# Patient Record
Sex: MALE | Race: OTHER | NOT HISPANIC OR LATINO
[De-identification: names, ages, dates, MRNs, and addresses within clinical notes are randomized per-mention and may not be internally consistent; named-entity substitution may affect disease eponyms.]

---

## 2020-09-21 ENCOUNTER — TRANSCRIPTION ENCOUNTER (OUTPATIENT)
Age: 60
End: 2020-09-21

## 2021-04-19 PROBLEM — Z00.00 ENCOUNTER FOR PREVENTIVE HEALTH EXAMINATION: Status: ACTIVE | Noted: 2021-04-19

## 2021-04-26 DIAGNOSIS — M25.50 PAIN IN UNSPECIFIED JOINT: ICD-10-CM

## 2021-04-26 DIAGNOSIS — Z87.19 PERSONAL HISTORY OF OTHER DISEASES OF THE DIGESTIVE SYSTEM: ICD-10-CM

## 2021-04-26 DIAGNOSIS — Z83.79 FAMILY HISTORY OF OTHER DISEASES OF THE DIGESTIVE SYSTEM: ICD-10-CM

## 2021-04-26 DIAGNOSIS — G47.33 OBSTRUCTIVE SLEEP APNEA (ADULT) (PEDIATRIC): ICD-10-CM

## 2021-04-26 DIAGNOSIS — K57.32 DIVERTICULITIS OF LARGE INTESTINE W/OUT PERFORATION OR ABSCESS W/OUT BLEEDING: ICD-10-CM

## 2021-04-26 DIAGNOSIS — K64.9 UNSPECIFIED HEMORRHOIDS: ICD-10-CM

## 2021-04-26 DIAGNOSIS — K76.0 FATTY (CHANGE OF) LIVER, NOT ELSEWHERE CLASSIFIED: ICD-10-CM

## 2021-04-26 DIAGNOSIS — K21.9 GASTRO-ESOPHAGEAL REFLUX DISEASE W/OUT ESOPHAGITIS: ICD-10-CM

## 2021-04-26 DIAGNOSIS — Z87.891 PERSONAL HISTORY OF NICOTINE DEPENDENCE: ICD-10-CM

## 2021-04-26 DIAGNOSIS — Z80.8 FAMILY HISTORY OF MALIGNANT NEOPLASM OF OTHER ORGANS OR SYSTEMS: ICD-10-CM

## 2021-04-26 RX ORDER — FAMOTIDINE 10 MG/1
TABLET, FILM COATED ORAL
Refills: 0 | Status: ACTIVE | COMMUNITY

## 2021-04-26 RX ORDER — BISMUTH SUBSALICYLATE 262 MG
TABLET,CHEWABLE ORAL
Refills: 0 | Status: ACTIVE | COMMUNITY

## 2021-04-27 ENCOUNTER — APPOINTMENT (OUTPATIENT)
Dept: UROLOGY | Facility: CLINIC | Age: 61
End: 2021-04-27
Payer: COMMERCIAL

## 2021-04-27 VITALS
SYSTOLIC BLOOD PRESSURE: 128 MMHG | WEIGHT: 170 LBS | HEART RATE: 75 BPM | HEIGHT: 73 IN | DIASTOLIC BLOOD PRESSURE: 85 MMHG | TEMPERATURE: 97.8 F | OXYGEN SATURATION: 98 % | BODY MASS INDEX: 22.53 KG/M2

## 2021-04-27 DIAGNOSIS — N52.01 ERECTILE DYSFUNCTION DUE TO ARTERIAL INSUFFICIENCY: ICD-10-CM

## 2021-04-27 DIAGNOSIS — Z80.3 FAMILY HISTORY OF MALIGNANT NEOPLASM OF BREAST: ICD-10-CM

## 2021-04-27 DIAGNOSIS — Z82.0 FAMILY HISTORY OF EPILEPSY AND OTHER DISEASES OF THE NERVOUS SYSTEM: ICD-10-CM

## 2021-04-27 DIAGNOSIS — Z85.828 PERSONAL HISTORY OF OTHER MALIGNANT NEOPLASM OF SKIN: ICD-10-CM

## 2021-04-27 DIAGNOSIS — M51.26 OTHER INTERVERTEBRAL DISC DISPLACEMENT, LUMBAR REGION: ICD-10-CM

## 2021-04-27 DIAGNOSIS — N28.1 CYST OF KIDNEY, ACQUIRED: ICD-10-CM

## 2021-04-27 DIAGNOSIS — K40.90 UNILATERAL INGUINAL HERNIA, W/OUT OBSTRUCTION OR GANGRENE, NOT SPECIFIED AS RECURRENT: ICD-10-CM

## 2021-04-27 DIAGNOSIS — Z85.820 PERSONAL HISTORY OF MALIGNANT MELANOMA OF SKIN: ICD-10-CM

## 2021-04-27 DIAGNOSIS — R10.9 UNSPECIFIED ABDOMINAL PAIN: ICD-10-CM

## 2021-04-27 LAB
BILIRUB UR QL STRIP: NORMAL
CLARITY UR: CLEAR
COLLECTION METHOD: NORMAL
GLUCOSE UR-MCNC: NORMAL
HCG UR QL: 0.2 EU/DL
HGB UR QL STRIP.AUTO: NORMAL
KETONES UR-MCNC: NORMAL
LEUKOCYTE ESTERASE UR QL STRIP: NORMAL
NITRITE UR QL STRIP: NORMAL
PH UR STRIP: 7
PROT UR STRIP-MCNC: NORMAL
SP GR UR STRIP: 1.01

## 2021-04-27 PROCEDURE — 99072 ADDL SUPL MATRL&STAF TM PHE: CPT

## 2021-04-27 PROCEDURE — 99205 OFFICE O/P NEW HI 60 MIN: CPT

## 2021-04-27 PROCEDURE — 76857 US EXAM PELVIC LIMITED: CPT

## 2021-04-27 RX ORDER — TADALAFIL 10 MG/1
10 TABLET, FILM COATED ORAL
Qty: 10 | Refills: 6 | Status: ACTIVE | COMMUNITY
Start: 2021-04-27 | End: 1900-01-01

## 2021-04-27 NOTE — ADDENDUM
[FreeTextEntry1] : A portion of this note was written by [Rehan Rosas] on 04/26/2021 acting as a scribe for Dr. Lugo. \par \par I have personally reviewed the chart and agree that the record accurately reflects my personal performance of the history, physical exam, assessment, and plan.

## 2021-04-27 NOTE — ASSESSMENT
[FreeTextEntry1] : I discussed the findings and options with Mr. DEXTER RAY in detail.  I reviewed the CT scan which shows diffuse bladder thickening which is a nonspecific finding and does not need any additional evaluation.  I outlined behavioral modification for his voiding symptoms.  At Dr. Smith's recommendation, he is interested in addressing the voiding symptoms as these may be contributing to his chronic abdominal discomfort.  Towards this end, I re-prescribed Flomax 0.4 mg to be taken at bedtime, and I instructed him on the potential side effects. \par \par Regarding the erectile dysfunction, I have provided Mr. Medrano with a prescription for Cialis 10 mg and advised that he use one half the dose initially.  I explained its mode of use and potential side effects.\par \par Finally, the decreased libido can be evaluated with a serum testosterone determination but this needs to be drawn in the early morning.  Mr. Ray will decide if he wants to pursue this.\par

## 2021-04-27 NOTE — HISTORY OF PRESENT ILLNESS
[FreeTextEntry1] : Mr. DEXTER RAY comes in today for a urologic evaluation.  He presents with a 4 year onset of dull intermittent localized mid epigastric discomfort "possibly triggered by indomethacin previously taken for headaches".  This is more pronounced in the morning and is occasionally associated with bloating and flatulence. Extensive evaluations by at least 3 Gastroenterologist, as well as medications for gastric reflux, have not resolved the problem. A recent CT scan identified diffuse bladder wall thickening suggesting chronic cystitis (see below).\par \par From his general urologic history, Mr. Ray reports moderate very longstanding lower urinary tract symptoms (predominantly irritative, as well as obstructive), with nocturia x 2–3. He had tried Flomax and Vesicare (prescribed by Dr. LINDY Weeks) more than 10 years ago, without a durable response.  He drinks 2 cups of tea and 1 bottle of wine daily.\par IPSS: 15/35\par Sono: 34cc PVR; 20cc prostate\par \par Mr. Ray has a several year onset of erectile dysfunction.  He also has noted decreased libido for the past 3 years. \par \par PSAs: 12/8/20--0.5; 12/23/05--0.2\par \par CT: 4/15/21--Diffusely thickened bladder wall suggesting chronic cystitis, clinical correlation is suggested.  Left inguinal hernia. Diverticulosis. Right renal cysts up to 2cm;

## 2021-04-27 NOTE — PHYSICAL EXAM
[General Appearance - Well Developed] : well developed [General Appearance - Well Nourished] : well nourished [Normal Appearance] : normal appearance [Well Groomed] : well groomed [General Appearance - In No Acute Distress] : no acute distress [Edema] : no peripheral edema [Respiration, Rhythm And Depth] : normal respiratory rhythm and effort [Exaggerated Use Of Accessory Muscles For Inspiration] : no accessory muscle use [Abdomen Soft] : soft [Costovertebral Angle Tenderness] : no ~M costovertebral angle tenderness [Abdomen Tenderness] : non-tender [Urethral Meatus] : meatus normal [Urinary Bladder Findings] : the bladder was normal on palpation [Scrotum] : the scrotum was normal [Testes Mass (___cm)] : there were no testicular masses [No Prostate Nodules] : no prostate nodules [Normal Station and Gait] : the gait and station were normal for the patient's age [] : no rash [No Focal Deficits] : no focal deficits [Oriented To Time, Place, And Person] : oriented to person, place, and time [Affect] : the affect was normal [Mood] : the mood was normal [Not Anxious] : not anxious [No Palpable Adenopathy] : no palpable adenopathy [Heart Rate And Rhythm] : Heart rate and rhythm were normal [Abdomen Mass (___ Cm)] : no abdominal mass palpated [Abdomen Hernia] : no hernia was discovered [Penis Abnormality] : normal circumcised penis [Epididymis] : the epididymides were normal [Testes Tenderness] : no tenderness of the testes [Prostate Tenderness] : the prostate was not tender [Skin Color & Pigmentation] : normal skin color and pigmentation [FreeTextEntry1] : As above

## 2021-04-27 NOTE — PHYSICAL EXAM
[General Appearance - Well Developed] : well developed [General Appearance - Well Nourished] : well nourished [Normal Appearance] : normal appearance [Well Groomed] : well groomed [General Appearance - In No Acute Distress] : no acute distress [Edema] : no peripheral edema [Respiration, Rhythm And Depth] : normal respiratory rhythm and effort [Exaggerated Use Of Accessory Muscles For Inspiration] : no accessory muscle use [Abdomen Soft] : soft [Abdomen Tenderness] : non-tender [Costovertebral Angle Tenderness] : no ~M costovertebral angle tenderness [Urethral Meatus] : meatus normal [Urinary Bladder Findings] : the bladder was normal on palpation [Scrotum] : the scrotum was normal [Testes Mass (___cm)] : there were no testicular masses [No Prostate Nodules] : no prostate nodules [Normal Station and Gait] : the gait and station were normal for the patient's age [] : no rash [No Focal Deficits] : no focal deficits [Oriented To Time, Place, And Person] : oriented to person, place, and time [Affect] : the affect was normal [Not Anxious] : not anxious [Mood] : the mood was normal [No Palpable Adenopathy] : no palpable adenopathy [Heart Rate And Rhythm] : Heart rate and rhythm were normal [Abdomen Mass (___ Cm)] : no abdominal mass palpated [Abdomen Hernia] : no hernia was discovered [Penis Abnormality] : normal circumcised penis [Epididymis] : the epididymides were normal [Testes Tenderness] : no tenderness of the testes [Prostate Tenderness] : the prostate was not tender [Skin Color & Pigmentation] : normal skin color and pigmentation [FreeTextEntry1] : As above

## 2021-04-27 NOTE — LETTER BODY
[Dear  ___] : Dear  [unfilled], [Consult Letter:] : I had the pleasure of evaluating your patient, [unfilled]. [Please see my note below.] : Please see my note below. [Consult Closing:] : Thank you very much for allowing me to participate in the care of this patient.  If you have any questions, please do not hesitate to contact me. [Sincerely,] : Sincerely, [DrFaviola  ___] : Dr. ELLIOTT [FreeTextEntry3] : Júnior Lugo MD, FACS

## 2021-07-13 ENCOUNTER — APPOINTMENT (OUTPATIENT)
Dept: UROLOGY | Facility: CLINIC | Age: 61
End: 2021-07-13
Payer: COMMERCIAL

## 2021-07-13 VITALS
TEMPERATURE: 97.7 F | HEART RATE: 76 BPM | OXYGEN SATURATION: 99 % | SYSTOLIC BLOOD PRESSURE: 141 MMHG | DIASTOLIC BLOOD PRESSURE: 85 MMHG

## 2021-07-13 PROCEDURE — 99215 OFFICE O/P EST HI 40 MIN: CPT

## 2021-07-13 NOTE — ASSESSMENT
[FreeTextEntry1] : \par =======================================================================================\par ASSESSMENT and PLAN\par \par The patient is a 60 year male with a history of the following:\par \par 1. Bladder wall thickening, LUTS, abdominal pain:\par The patient has a small prostate with Ca++ and life long urinary frequency. I explained that the bladder is a muscle and that circumferential thickening is indicative of some form of bladder outlet obstruction usually. This could be due to primary BNO, BPH or PFD (pelvic floor dysfunction). Given the longstanding nature of his LUTS, I suspect he may have PFD. We did however have a long discussion about his diet, GI symptoms and wine consumption. Given the longterm nature of many of his conditions (his LUTS, his wine consumption) treatment may not be achieved in short term.\par \par In summary, I suspect there are multiple factors at play that likely involve SIBO, diet, wine consumption, and PFD. Treatment for these conditions requires (durable) changes in habit and can also require prolonged and recurring treatment. I do not think that his abdominal pain is due to his bladder or  tract.\par \par Because his prostate is 20gm he is not a candidate for a 5-RUSSELL. If we truly believe his issue is his prostate, the next step for medicine refractory LUTS/ROPER would be UD's to see if he is a surgical candidate. I do however think he should get his pelvic floor evaluated before we consider this and he understood. I urged him to consider other lifestyle modifications.\par \par He will RTC to see me PRN\par \par -----------------------------------------------------------------------------------------------------\par LABS/TESTS Ordered:\par Meds Ordered:\par Follow up: PRN (/ after pelvic floor PT)\par -----------------------------------------------------------------------------------------------------\par \par Greater than 50% of this 70 minute visit was spent counseling the patient and coordinating care.\par \[facility] Thank you for allowing me to assist in the care of your patient. Should you have any questions please do not hesitate to reach out to me.\par \par \par She Philip MD\par Associate \[facility] Department of Urology\[facility] Phone: [phone]\[facility] Fax: [phone]\par \[facility] [address]\[facility] [place] [address]\[facility]

## 2021-07-13 NOTE — HISTORY OF PRESENT ILLNESS
[FreeTextEntry1] : Language: English\par Date of First visit: 7/13/2021 (with me)\par Accompanied by: Self\par Contact info: [phone]\par Referring Provider/PCP: Marilou Haley MD\par \par \par =======================================================================================\par FIRST VISIT:\par The patient is a 60 year male who first presents 07/13/2021 for LUTS, ED, and low libido.\par \par The patient was seen by Dr. Lugo on 4/27/2021.\par He had a sonogram on that day that showed a 20gm prostate, 34cc PVR. \par He saw Dr. Lugo on 4/27/2021 and reported 4 years of mid epigastric pain possibly from indomethacin, bloating, and flatulence. He had seen 3 GI doctors and had undergone multiple conservative interventions (meds/diet changes). He reported to Dr. Lugo that he had moderate LUTS (long standing) and nocturia. He had tried tamsulosin and Vesicare prior to 2010 and did not have a durable response. He drinks 2 cups of tea and 1 bottle of wine daily.\par IPSS: 15/35 He also reported ED for several years and lower libido since about 2018.\par At his visit on 4/27/2021 he was given Cialis and Tamsulosin and he was offered a testosterone level which he never pursued.\par \par \par TODAY 7/13/2021 he reports abdominal pain, acid reflux and bloating. He has been treated with diet, meds, timed diet, etc. He is seeing Dr. Haley. He also has SIBO, diverticulosis and herniated disks. He was also referred to [facility] by Dr. Haley for SIBO, PFD and Diverticular disease with bloating.\par \par He has always had an "extremely active" bladder since he was a teenager. He has nocturia 2-4 times per night. He does drink a full bottle of wine each night. He has lower abdominal pain and broadly across his suprapubic region. It is sometimes dull (95%) and rarely sharp. It does not radiate. It is worse early in the day. It usually goes away after a while. He has been on abx, pepcid, pepto bismol, FODMAP to no EtOH to eating earlier to bland diets. He feels that eating chicken/chicken broth helps. He does not feel the pain is triggered by food. He generally has loose stools. He has BMs Q1-3x/day. He never has constipation. He has never had back surgery but he does do back exercises where he hyperextends his back. He feels very flatulent and bloated. He had rectal fistula surgery and feels his rectum has not been the same since. He also has very tight hip adductors. \par He stopped taking tamsulosin and Cialis. He does not feel either med worked.\par \par \par -------------------------------------------------------------------------------------------\par INTERVAL VISITS:\par \par \par =======================================================================================\par \par PMH: As above, melanoma, BCC, Fatty Liver (he was told this was not due to his EtOH)\par PSH: Rectal fissures, Two melanomas\par POBH: (if applicable)\par FH: Skin cancer\par \par ALL: NKA\par MEDS: Melatonin, B12 (to start soon)\par SOC: 1 bottle of wine per day; Cigar monthly; Denies illegal drugs\par \par \par ROS: Review of Systems is as per HPI unless otherwise denoted below\par \par \par =======================================================================================\par DATA: \par \par LABS:-----------------------------------------------------------------------------------------------------\par 4/27/2021: UA dip negative\par \par PSAs: \par 12/23/2005: PSA 0.2\par 12/8/2020: PSA 0.5\par \par \par RADS:-----------------------------------------------------------------------------------------------------\par 4/15/2021: CTAP with contrast\par Liver, spleen, adrenals, pancreas and kidneys are normal. GB is normal. Duodenal diverticulum is present. No hydro. Small right renal cyst 2cm. Prostate is not enlarged but does have Ca++. Bladder is diffusely thickened. Sigmoid has diverticulosis. small fat containing left IH (films personally reviewed and returned back to the pt)\par \par 4/27/2021: In office Sono (Dr. Lugo)\par 20gm prostate and 34cc PVR\par \par \par PATHOLOGY/CYTOLOGY:-----------------------------------------------------------------------------------------------------\par \par \par \par VOIDING STUDIES: -----------------------------------------------------------------------------------------------------\par \par \par \par =======================================================================================\par \par \par PHYSICAL EXAM:\par \par GEN: AAOx3, NAD; Habitus: normal\par \par BARRIERS to CARE: none\par \par PSYCH: Appropriate Behavior, Affect Congruent\par \par HEENT: AT/NC Trachea midline. EOMI.\par \par Lungs: No labored breathing.\par \par NEURO: + Movement, all 4 extremities grossly intact without deficits. No tremors.\par \par SKIN: Warm dry. No visible rashes or ulcers\par \par GAIT: Gait normal, Stability good\par \par : Full exam was done by Dr. Lugo\par =======================================================================================\par \par \par \par

## 2022-11-17 ENCOUNTER — APPOINTMENT (OUTPATIENT)
Dept: OTOLARYNGOLOGY | Facility: CLINIC | Age: 62
End: 2022-11-17

## 2022-11-17 VITALS — BODY MASS INDEX: 22.53 KG/M2 | HEIGHT: 73 IN | TEMPERATURE: 96.6 F | WEIGHT: 170 LBS

## 2022-11-17 DIAGNOSIS — J34.89 OTHER SPECIFIED DISORDERS OF NOSE AND NASAL SINUSES: ICD-10-CM

## 2022-11-17 DIAGNOSIS — J31.0 CHRONIC RHINITIS: ICD-10-CM

## 2022-11-17 PROCEDURE — 92557 COMPREHENSIVE HEARING TEST: CPT

## 2022-11-17 PROCEDURE — 31231 NASAL ENDOSCOPY DX: CPT

## 2022-11-17 PROCEDURE — 99203 OFFICE O/P NEW LOW 30 MIN: CPT | Mod: 25

## 2022-11-17 PROCEDURE — 92567 TYMPANOMETRY: CPT

## 2022-11-17 NOTE — CONSULT LETTER
[FreeTextEntry2] : MAGDA SOUSA\par  [FreeTextEntry1] : \par \par Dear  Dr. MAGDA SOUSA,\par \par I had the pleasure of seeing your patient today.  \par Please see my note below.\par \par \par Thank you very much for allowing me to participate in the care of your patient.\par \par Sincerely,\par \par \par Leonardo Wright MD\par [facility]\par [facility]\par  [facility]\par \par

## 2022-11-17 NOTE — ASSESSMENT
[FreeTextEntry1] : It was my impression that he has a clean septal perforation.  I did not see granulations or evidence of bleeding and just recommended topical moisturizing and nasal saline rinses.\par \par He has a longstanding otosclerosis and a large conductive hearing loss in the right ear.  He would likely be a good candidate for stapedectomy and this was discussed.  However, he prefers to avoid surgery and in that case he would likely do quite well with a hearing aid in the right ear.  He is now developing high-frequency sensorineural components in both ears so it seems likely he will require hearing aids in any case even with successful stapedectomy soon if not now and this was discussed.  He may want to consider binaural amplification as well.  Microphone off

## 2022-11-17 NOTE — HISTORY OF PRESENT ILLNESS
[de-identified] : DEXTER CLARK is a 61 year old male who comes in complaining of a right-sided hearing loss.  He brings in with him records extending back to 1999 which shows a conductive hearing loss in the right ear and type a tympanograms.  He does not have any significant left-sided symptoms and denies otalgia or otorrhea or vertigo.  He does not have a family history of known otosclerosis.  He has a history of a septal perforation.  He does not get bleeding but gets congested at times with crusting.  The patient had no other ear nose or throat complaints at this visit.

## 2022-11-17 NOTE — PHYSICAL EXAM
[FreeTextEntry1] : \par The patient was alert and oriented and in no distress.\par Voice was clear.\par \par Face:\par The patient had no facial asymmetry or mass.\par The skin was unremarkable.\par \par Eyes:\par The pupils were equal round and reactive to light and accommodation.\par There was no significant nystagmus or disconjugate gaze noted.\par \par Nose: \par The external nose had no significant deformity.  There was no facial tenderness.  On anterior rhinoscopy, the nasal mucosa was clear.  The anterior septum was midline.  There were no visualized polyps purulence  or masses.\par \par Oral cavity:\par The oral mucosa was normal.\par The oral and base of tongue were clear and without mass.\par The gingival and buccal mucosa were moist and without lesions.\par The palate moved well.\par There was no cleft to the palate.\par There appeared to be good salivary flow.  \par There was no pus, erythema or mass in the oral cavity.\par \par \par Ears:\par The external ears were normal without deformity.\par The ear canals were clear.\par The tympanic membranes were intact and normal.\par \par Neck: \par The neck was symmetrical.\par The parotid and submandibular glands were normal without masses.\par The trachea was midline and there was no unusual crepitus.\par The thyroid was smooth and nontender and no masses were palpated.\par There was no significant cervical adenopathy.\par \par \par Neuro:\par Neurologically, the patient was awake, alert, and oriented to person, place and time. There were no obvious focal neurologic abnormalities.  Cranial nerves II through XII were grossly intact.\par \par \par TMJ:\par The temporomandibular joints were nontender.\par There was no abnormal crepitus and no significant malocclusion\par \par \par Tuning forks:\par The Ferguson evaluation lateralized to the right\par \par \par Nasal endoscopy: \par CPT 61189\par Procedure Note:\par \par Endoscopy was done with Covid precautions and with video. All risks and benefits were discussed with the patient and consent obtained.\par \par Nasal endoscopy was done with topical anesthesia of Pontocaine and Afrin and a      nasal endoscope.\par Indication: Nasal congestion, rule out sinusitis.\par Procedure: The nasal cavity was anesthetized with topical Afrin and Pontocaine. An  endoscope was used and inserted into the nasal cavity.\par Attention was first paid to the anterior nasal cavity.\par Endocoscopy was performed to inspect the interior of the nasal cavity, the nasal septum,  the middle and superior meati, the inferior, middle and superior turbinates, and the spheno-ethmoidal  recesses, the nasopharynx and eustachian tube orifices bilaterally. \par All findings were normal except:\par The nasal mucosa was beefy with a clean approximately 1/2 cm cartilaginous perforation without eschar or granulations [de-identified] : Nasal endoscopy: \par CPT 90262\par Procedure Note:\par \par Endoscopy was done with Covid precautions and with video. All risks and benefits were discussed with the patient and consent obtained.\par \par Nasal endoscopy was done with topical anesthesia of Pontocaine and Afrin and a      nasal endoscope.\par Indication: Nasal congestion, rule out sinusitis.\par Procedure: The nasal cavity was anesthetized with topical Afrin and Pontocaine. An  endoscope was used and inserted into the nasal cavity.\par Attention was first paid to the anterior nasal cavity.\par Endocoscopy was performed to inspect the interior of the nasal cavity, the nasal septum,  the middle and superior meati, the inferior, middle and superior turbinates, and the spheno-ethmoidal  recesses, the nasopharynx and eustachian tube orifices bilaterally. \par All findings were normal except: The nasal mucosa is somewhat boggy with a clean 1 cm septal perforation with a moderate S-shaped deflection\par \par \par A complete audiogram was ordered, done and reviewed with the patient.  This was compared to his previous audiogram from 12 years ago and shows he now has bilateral symmetric high-frequency sensorineural hearing losses as well as his type a tympanograms and 30 dB low-frequency conductive losses in the right ear compared to the left.\par

## 2022-11-17 NOTE — REVIEW OF SYSTEMS
[Hearing Loss] : hearing loss [Nasal Congestion] : nasal congestion [Discolored Nasal Discharge] : discolored nasal discharge [Negative] : Heme/Lymph [de-identified] : heartburn

## 2022-12-13 ENCOUNTER — APPOINTMENT (OUTPATIENT)
Dept: OTOLARYNGOLOGY | Facility: CLINIC | Age: 62
End: 2022-12-13

## 2022-12-13 PROCEDURE — V5010 ASSESSMENT FOR HEARING AID: CPT | Mod: NC

## 2022-12-14 ENCOUNTER — APPOINTMENT (OUTPATIENT)
Dept: OTOLARYNGOLOGY | Facility: CLINIC | Age: 62
End: 2022-12-14

## 2022-12-14 VITALS — TEMPERATURE: 97.2 F | BODY MASS INDEX: 22.53 KG/M2 | WEIGHT: 170 LBS | HEIGHT: 73 IN

## 2022-12-14 PROCEDURE — 92504 EAR MICROSCOPY EXAMINATION: CPT

## 2022-12-14 PROCEDURE — 99214 OFFICE O/P EST MOD 30 MIN: CPT | Mod: 25

## 2022-12-14 RX ORDER — NAPROXEN 500 MG/1
TABLET ORAL
Refills: 0 | Status: ACTIVE | COMMUNITY

## 2022-12-14 RX ORDER — NORTRIPTYLINE HYDROCHLORIDE 75 MG/1
CAPSULE ORAL
Refills: 0 | Status: ACTIVE | COMMUNITY

## 2022-12-14 NOTE — ASSESSMENT
[FreeTextEntry1] : Significant hearing loss affecting the right ear greater than the left.  Management options carefully reviewed.  The etiology of hearing loss is most likely otosclerosis but other etiologies are possible.  Today's tuning fork testing was not consistent with audiometry.  I have reviewed this with the patient in detail.  I have recommended CT scan evaluation.\par \par We discussed the option of middle ear surgery.  Risks benefits and alternatives reviewed in detail.  The continued need for amplification was discussed.\par \par I have recommended CT scan evaluation.\par \par I have recommended virtual or live follow-up visit to review images and to discuss management further.

## 2022-12-14 NOTE — DATA REVIEWED
[de-identified] : Recent audiometry reviewed in detail.  This was compared to previous hearing testing provided.

## 2022-12-14 NOTE — PHYSICAL EXAM
[Normal] : temporomandibular joint is normal [FreeTextEntry1] : Procedure: Microscopic Ear Exam\par \par Left ear:  Ear canal intact without inflammation or lesion.  \par Tympanic membrane intact without inflammation.\par \par Right ear:  Ear canal intact without inflammation or lesion.  \par Tympanic membrane intact without inflammation.\par \par Tuning Fork Hearing Assessment\par 512 Hz:\par Ferguson test: referred to the right ear\par Rinne test:\par 	Right Ear: Air Conduction > Bone Conduction\par 	Left Ear:   Air Conduction > Bone conduction\par

## 2022-12-14 NOTE — HISTORY OF PRESENT ILLNESS
[de-identified] : DEXTER CLARK has a history of progressive hearing loss in the right ear for several years since or about 2007with increasing difficulty hearing voices in noise.  Mild intermittent tinnitus.  No vertigo. \par FH: No known premature hearing loss.

## 2022-12-14 NOTE — CONSULT LETTER
[Please see my note below.] : Please see my note below. [FreeTextEntry2] : Dear MAGDA SOUSA  [FreeTextEntry1] : Thank you for allowing me to participate in the care of DEXTER CLARK .\par Please see the attached visit note.\par \par \par \par Inocencio Gant\par Otology\par Medical Director [facility]\par Department of Otolaryngology\par [facility]

## 2022-12-16 ENCOUNTER — RESULT REVIEW (OUTPATIENT)
Age: 62
End: 2022-12-16

## 2023-01-12 ENCOUNTER — OUTPATIENT (OUTPATIENT)
Dept: OUTPATIENT SERVICES | Facility: HOSPITAL | Age: 63
LOS: 1 days | End: 2023-01-12

## 2023-01-12 ENCOUNTER — APPOINTMENT (OUTPATIENT)
Dept: CT IMAGING | Facility: CLINIC | Age: 63
End: 2023-01-12
Payer: COMMERCIAL

## 2023-01-12 PROCEDURE — 70480 CT ORBIT/EAR/FOSSA W/O DYE: CPT | Mod: 26

## 2023-01-25 ENCOUNTER — APPOINTMENT (OUTPATIENT)
Dept: OTOLARYNGOLOGY | Facility: CLINIC | Age: 63
End: 2023-01-25
Payer: COMMERCIAL

## 2023-01-25 VITALS — TEMPERATURE: 97.3 F | WEIGHT: 170 LBS | BODY MASS INDEX: 22.53 KG/M2 | HEIGHT: 73 IN

## 2023-01-25 PROCEDURE — 99214 OFFICE O/P EST MOD 30 MIN: CPT

## 2023-01-25 PROCEDURE — 92504 EAR MICROSCOPY EXAMINATION: CPT

## 2023-01-25 NOTE — DATA REVIEWED
[de-identified] : Previous and recent audiometry reviewed in detail [de-identified] : CT scan images reviewed with the patient in detail.

## 2023-01-25 NOTE — ASSESSMENT
[FreeTextEntry1] : I have discussed the etiologies of his hearing loss in detail.  The conductive component appears to be most likely related to otosclerosis.  Management options carefully reviewed in detail.  All risks limitations, complications and alternatives reviewed in detail.  Questions answered.  He wished to proceed with surgical planning.\par \par Surgical plan: Right laser stapedotomy, possible ossiculoplasty, left hand vein graft

## 2023-01-25 NOTE — CONSULT LETTER
[Please see my note below.] : Please see my note below. [FreeTextEntry2] : Dear MAGDA SOUSA  [FreeTextEntry1] : Thank you for allowing me to participate in the care of DEXTER CLARK .\par Please see the attached visit note.\par \par \par \par Inocencio Gant\par Otology\par Medical Director [facility]\par Department of Otolaryngology\par [facility]

## 2023-01-25 NOTE — HISTORY OF PRESENT ILLNESS
[de-identified] : DEXTER CLARK has a history of progressive hearing loss in the right ear for several years since or about 2007with increasing difficulty hearing voices in noise.  Mild intermittent tinnitus.  No vertigo. \par FH: No known premature hearing loss.  [FreeTextEntry1] : 01/25/2023 \par No reported change in hearing.  The patient does report difficulty with daily communication due to his hearing loss.  CT scan results reviewed with the patient.

## 2023-02-08 ENCOUNTER — APPOINTMENT (OUTPATIENT)
Dept: OTOLARYNGOLOGY | Facility: CLINIC | Age: 63
End: 2023-02-08
Payer: COMMERCIAL

## 2023-02-08 PROCEDURE — 99212 OFFICE O/P EST SF 10 MIN: CPT | Mod: 95

## 2023-02-08 NOTE — ASSESSMENT
[FreeTextEntry1] : New or changed symptoms related to tinnitus and ear fullness reported.  I have recommended reevaluation for audiometry prior to surgical intervention.  He agreed to this plan.

## 2023-02-08 NOTE — HISTORY OF PRESENT ILLNESS
[Home] : at home, [unfilled] , at the time of the visit. [Medical Office: ([facility])___] : at the medical office located in  [Verbal consent obtained from patient] : the patient, [unfilled] [de-identified] : DEXTER CLARK has a history of progressive hearing loss in the right ear for several years since or about 2007with increasing difficulty hearing voices in noise.  Mild intermittent tinnitus.  No vertigo. \par FH: No known premature hearing loss.  [FreeTextEntry1] : 02/08/2023 \par Noting tinnitus in both ears intermittently.  Also reports blockage sensation these are new symptoms.  He is scheduled for surgery next month

## 2023-02-27 ENCOUNTER — APPOINTMENT (OUTPATIENT)
Dept: OTOLARYNGOLOGY | Facility: CLINIC | Age: 63
End: 2023-02-27
Payer: COMMERCIAL

## 2023-02-27 ENCOUNTER — APPOINTMENT (OUTPATIENT)
Dept: OTOLARYNGOLOGY | Facility: CLINIC | Age: 63
End: 2023-02-27

## 2023-02-27 PROCEDURE — 92557 COMPREHENSIVE HEARING TEST: CPT

## 2023-02-27 PROCEDURE — 92550 TYMPANOMETRY & REFLEX THRESH: CPT | Mod: 52

## 2023-03-30 ENCOUNTER — RESULT REVIEW (OUTPATIENT)
Age: 63
End: 2023-03-30

## 2023-03-30 ENCOUNTER — APPOINTMENT (OUTPATIENT)
Age: 63
End: 2023-03-30
Payer: COMMERCIAL

## 2023-03-30 PROCEDURE — 95867 NDL EMG CRANIAL NRV MUSC UNI: CPT | Mod: 26

## 2023-03-30 PROCEDURE — 69660 REVISE MIDDLE EAR BONE: CPT

## 2023-03-30 PROCEDURE — 37799 UNLISTED PX VASCULAR SURGERY: CPT | Mod: LT

## 2023-04-03 ENCOUNTER — APPOINTMENT (OUTPATIENT)
Dept: OTOLARYNGOLOGY | Facility: CLINIC | Age: 63
End: 2023-04-03
Payer: COMMERCIAL

## 2023-04-03 PROCEDURE — 99024 POSTOP FOLLOW-UP VISIT: CPT

## 2023-04-03 NOTE — HISTORY OF PRESENT ILLNESS
[de-identified] : DEXTER CLARK has a history of progressive hearing loss in the right ear for several years since or about 2007with increasing difficulty hearing voices in noise.  Mild intermittent tinnitus.  No vertigo. \par FH: No known premature hearing loss.  [FreeTextEntry1] : 04/03/2023 \par History\par post operative visit.\par Reports no significant pain.  No significant tinnitus.  No significant vertigo.  No bleeding reported.\par \par Physical Exam\par \par Face: Normal Function bilaterally\par \par Oral: Normal\par \par Neck: No mass, Full range of motion\par \par Hand: Incision intact, non inflamed\par \par \par Microscopic Ear Exam\par Right Ear:  Ear canal packing removed gently with forceps.  No significant inflammation noted.  The tympanic membrane is intact.\par \par Tuning Fork Testing\par 512 Hz:\par Ferguson test: referred to the Right Ear\par \par Wound care instructions were reviewed with the patient in detail. Followup plans discussed.

## 2023-04-25 ENCOUNTER — APPOINTMENT (OUTPATIENT)
Dept: UROLOGY | Facility: CLINIC | Age: 63
End: 2023-04-25
Payer: COMMERCIAL

## 2023-04-25 VITALS
OXYGEN SATURATION: 97 % | SYSTOLIC BLOOD PRESSURE: 145 MMHG | DIASTOLIC BLOOD PRESSURE: 90 MMHG | HEART RATE: 87 BPM | TEMPERATURE: 97.2 F

## 2023-04-25 PROCEDURE — 99215 OFFICE O/P EST HI 40 MIN: CPT

## 2023-04-25 NOTE — HISTORY OF PRESENT ILLNESS
[FreeTextEntry1] : Language: English\par Date of First visit: 7/13/2021 (with me)\par Accompanied by: Self\par Contact info: [phone]\par Referring Provider/PCP: Marilou Haley MD\par \par \par =======================================================================================\par FIRST VISIT:\par The patient is a 60 year male who first presents 07/13/2021 for LUTS, ED, and low libido.\par \par The patient was seen by Dr. Lugo on 4/27/2021.\par He had a sonogram on that day that showed a 20gm prostate, 34cc PVR. \par He saw Dr. Lugo on 4/27/2021 and reported 4 years of mid epigastric pain possibly from indomethacin, bloating, and flatulence. He had seen 3 GI doctors and had undergone multiple conservative interventions (meds/diet changes). He reported to Dr. Lugo that he had moderate LUTS (long standing) and nocturia. He had tried tamsulosin and Vesicare prior to 2010 and did not have a durable response. He drinks 2 cups of tea and 1 bottle of wine daily.\par IPSS: 15/35 He also reported ED for several years and lower libido since about 2018.\par At his visit on 4/27/2021 he was given Cialis and Tamsulosin and he was offered a testosterone level which he never pursued.\par \par He has been treated with diet, meds, timed diet, etc. He is seeing Dr. Haley. He also has SIBO, diverticulosis and herniated disks. He was also referred to [facility] by Dr. Haley for SIBO, PFD and Diverticular disease with bloating.\par \par He has always had an "extremely active" bladder since he was a teenager. He has nocturia 2-4 times per night. He does drink a full bottle of wine each night. He has lower abdominal pain and broadly across his suprapubic region. It is sometimes dull (95%) and rarely sharp. It does not radiate. It is worse early in the day. It usually goes away after a while. He has been on abx, pepcid, pepto bismol, FODMAP to no EtOH to eating earlier to bland diets. He feels that eating chicken/chicken broth helps. He does not feel the pain is triggered by food. He generally has loose stools. He has BMs Q1-3x/day. He never has constipation. He has never had back surgery but he does do back exercises where he hyperextends his back. He feels very flatulent and bloated. He had rectal fistula surgery and feels his rectum has not been the same since. He also has very tight hip adductors. \par He stopped taking tamsulosin and Cialis. He does not feel either med worked.\par \par \par -------------------------------------------------------------------------------------------\par INTERVAL VISITS:\par \par The patient's age today 04/25/2023 is 62 year old.\par Please note interval events and changes in PMH, PSH, MEDS and ALLERGIES were reviewed.\par He brought in records which I reviewed.\par He was told by his PCP that his prostate is a little "enlarged". He states his urination is regular and constant. \par He has some frequency and his stream is weak. He has a lot of flatulence. He has poor erections. His libido and orgasms have "disappeared". He saw Dr. Lugo and he was given Cialis and tamsulosin. \par \par He is an anti pill person and would prefer to have nocturia 4-5x than take a pill though sometimes it is 2-3x/night but four is common.\par He has some baseline frequency. He did pelvic floor PT. The PT does not feel that he has PFD but Dr. Haley thinks he does. He still drinks a lot of wine and water before bedtime.\par \par \par =======================================================================================\par \par PMH: As above, melanoma, BCC, Fatty Liver (he was told this was not due to his EtOH)\par PSH: Rectal fissures, Two melanomas\par POBH: (if applicable)\par FH: Skin cancer\par \par ALL: NKA\par MEDS: Melatonin, B12 (to start soon), nortryptiline\par SOC: 1 bottle of wine per day; Cigar monthly; Denies illegal drugs\par \par \par ROS: Review of Systems is as per HPI unless otherwise denoted below\par \par \par =======================================================================================\par DATA: \par \par LABS:-----------------------------------------------------------------------------------------------------\par 4/27/2021: UA dip negative\par \par PSAs: \par 12/23/2005: PSA 0.2\par 12/8/2020: PSA 0.5\par 3/7/2023: PSA 0.49, sCre 0.78\par \par \par RADS:-----------------------------------------------------------------------------------------------------\par 4/15/2021: CTAP with contrast\par Liver, spleen, adrenals, pancreas and kidneys are normal. GB is normal. Duodenal diverticulum is present. No hydro. Small right renal cyst 2cm. Prostate is not enlarged but does have Ca++. Bladder is diffusely thickened. Sigmoid has diverticulosis. small fat containing left IH (films personally reviewed and returned back to the pt)\par \par 4/27/2021: In office [place] (Dr. Lugo)\par 20gm prostate and 34cc PVR\par \par \par PATHOLOGY/CYTOLOGY:-----------------------------------------------------------------------------------------------------\par \par \par \par VOIDING STUDIES: -----------------------------------------------------------------------------------------------------\par \par \par \par =======================================================================================\par \par \par PHYSICAL EXAM:\par \par GEN: AAOx3, NAD; Habitus: normal\par \par BARRIERS to CARE: none\par \par PSYCH: Appropriate Behavior, Affect Congruent\par \par HEENT: AT/NC Trachea midline. EOMI.\par \par Lungs: No labored breathing.\par \par NEURO: + Movement, all 4 extremities grossly intact without deficits. No tremors.\par \par SKIN: Warm dry. No visible rashes or ulcers\par \par GAIT: Gait normal, Stability good\par \par : Full exam was done by Dr. Lugo\par =======================================================================================\par \par \par ASSESSMENT and PLAN\par \par The patient is a 60 year male with a history of the following:\par \par 1. Nocturia and urinary frequency\par I explained the numerous risks of poor sleep to the patient (immune system, neurologic, etc) and how he is already compromising his REM sleep with high wine intake. He does not like taking pills unfortunately short of cutting down on wine and PM fluids, there is not a lot I can offer behaviorally to reduce his urinary frequency. He understands this. With better sleep his libido, T, and erections may also improve. \par \par He does tend to have Low BP so mixing cialis and tamsulosin may not be great for him.\par \par He understood and he agreed to try tamsulosin.\par The patient was prescribed:\par \par    Flomax (tamsulosin) 0.4mg by mouth at bedtime.\par \par The patient understands that this medication may cause dizziness, retrograde ejaculation or nasal congestion among other complications. I recommended that the patient take this medication at night. If the side effects become too bothersome, the medication can be discontinued. The patient knows to alert his  eye doctor if he  is undergoing cataract surgery while on this medication.\par \par \par 2. prostate cancer screening\par His PSA was normal in 2023\par \par \par \par -----------------------------------------------------------------------------------------------------\par LABS/TESTS Ordered:\par Meds Ordered: start tamsulosin 4/25/2023\par Follow up: two months for PVR/symptom check\par -----------------------------------------------------------------------------------------------------\par \par The total amount of time I have personally spent preparing for this visit, reviewing the patient's test results, obtaining external history, ordering tests/medications, documenting clinical information, communicating with and counseling the patient/family and/or caregiver(s), and spent face to face with the patient explaining the above was  40 minutes.\par \par Thank you for allowing me to assist in the care of your patient. Should you have any questions please do not hesitate to reach out to me.\par \par \par She Philip MD\par Associate \[facility] Department of Urology\par [facility] Phone: [phone]\par Fax: [phone]\par \par [address]\[facility] [address]\par \par

## 2023-05-01 ENCOUNTER — APPOINTMENT (OUTPATIENT)
Dept: OTOLARYNGOLOGY | Facility: CLINIC | Age: 63
End: 2023-05-01
Payer: COMMERCIAL

## 2023-05-01 VITALS — BODY MASS INDEX: 22.53 KG/M2 | WEIGHT: 170 LBS | HEIGHT: 73 IN

## 2023-05-01 DIAGNOSIS — H80.90 CONDUCTIVE HEARING LOSS, UNSPECIFIED: ICD-10-CM

## 2023-05-01 DIAGNOSIS — H90.2 CONDUCTIVE HEARING LOSS, UNSPECIFIED: ICD-10-CM

## 2023-05-01 PROCEDURE — 92550 TYMPANOMETRY & REFLEX THRESH: CPT | Mod: 52

## 2023-05-01 PROCEDURE — 99024 POSTOP FOLLOW-UP VISIT: CPT

## 2023-05-01 PROCEDURE — 92557 COMPREHENSIVE HEARING TEST: CPT

## 2023-05-01 NOTE — HISTORY OF PRESENT ILLNESS
[de-identified] : DEXTER CLARK has a history of progressive hearing loss in the right ear for several years since or about 2007with increasing difficulty hearing voices in noise. Mild intermittent tinnitus. No vertigo. \par FH: No known premature hearing loss [FreeTextEntry1] : 05/01/2023 \par History\par post operative visit.\par Reports no significant pain.  significant tinnitus and sound distortion present.  No significant vertigo.\par Physical Exam\par \par Hand: Incision intact, non inflamed\par \par Microscopic Ear Exam\par Right Ear:  Ear canal healing well.  The tympanic membrane is intact.\par \par Complete audiometry was ordered and completed today. This was separately reported by the audiologist. The results were reviewed in detail with the patient.\par \par

## 2023-05-01 NOTE — ASSESSMENT
[FreeTextEntry1] : The patient is quite concerned about tinnitus and hearing distortion in his right ear.  He has completed 2 courses of oral steroids following his surgery.  Hearing levels have improved somewhat in the low frequencies but have worsened in the high frequencies.  We discussed this in detail.  I have answered his questions.  Slow improvement is expected.  Wound instructions reviewed.\par \par Follow-up recommended in 2 months with repeat audiometry.

## 2023-05-16 ENCOUNTER — APPOINTMENT (OUTPATIENT)
Dept: OTOLARYNGOLOGY | Facility: CLINIC | Age: 63
End: 2023-05-16

## 2023-05-16 ENCOUNTER — NON-APPOINTMENT (OUTPATIENT)
Age: 63
End: 2023-05-16

## 2023-05-24 ENCOUNTER — NON-APPOINTMENT (OUTPATIENT)
Age: 63
End: 2023-05-24

## 2023-06-05 ENCOUNTER — NON-APPOINTMENT (OUTPATIENT)
Age: 63
End: 2023-06-05

## 2023-06-14 ENCOUNTER — APPOINTMENT (OUTPATIENT)
Dept: OTOLARYNGOLOGY | Facility: CLINIC | Age: 63
End: 2023-06-14
Payer: COMMERCIAL

## 2023-06-14 VITALS — WEIGHT: 170 LBS | BODY MASS INDEX: 22.53 KG/M2 | HEIGHT: 73 IN

## 2023-06-14 DIAGNOSIS — R42 DIZZINESS AND GIDDINESS: ICD-10-CM

## 2023-06-14 PROCEDURE — 92550 TYMPANOMETRY & REFLEX THRESH: CPT | Mod: 52

## 2023-06-14 PROCEDURE — 99024 POSTOP FOLLOW-UP VISIT: CPT

## 2023-06-14 PROCEDURE — 92557 COMPREHENSIVE HEARING TEST: CPT

## 2023-06-14 RX ORDER — PREDNISONE 10 MG/1
10 TABLET ORAL
Qty: 30 | Refills: 0 | Status: DISCONTINUED | COMMUNITY
Start: 2023-04-13 | End: 2023-06-14

## 2023-06-14 RX ORDER — METHYLPREDNISOLONE 4 MG/1
4 TABLET ORAL
Qty: 1 | Refills: 0 | Status: COMPLETED | COMMUNITY
Start: 2023-03-30 | End: 2023-06-14

## 2023-06-14 RX ORDER — CEFADROXIL 500 MG/1
500 CAPSULE ORAL TWICE DAILY
Qty: 4 | Refills: 1 | Status: COMPLETED | COMMUNITY
Start: 2023-03-30 | End: 2023-06-14

## 2023-06-14 NOTE — CONSULT LETTER
[Please see my note below.] : Please see my note below. [FreeTextEntry2] : Dear MAGDA SOUSA  [FreeTextEntry1] : Thank you for allowing me to participate in the care of DEXTER CLARK .\par Please see the attached visit note.\par \par \par \par Inocencio Gant\par Otology\par Medical Director of [facility]\par Department of Otolaryngology\par [facility]

## 2023-06-14 NOTE — DATA REVIEWED
[de-identified] : In light of the patients current symptoms, Complete audiometry was ordered and completed today. I have interpreted these results and reviewed them in detail with the patient.\par \par Sloping sensorineural hearing loss affecting the right ear greater than the left

## 2023-06-14 NOTE — ASSESSMENT
[FreeTextEntry1] : Recent bout of severe vertigo lasting for several days with nausea while traveling overseas.  This was diagnosed as food poisoning.  He was treated while traveling with slow improvement reported.  He continues to have disequilibrium with no vertigo or nausea.  He is fairly active at this time and is planning to travel again in 2 days.\par \par Hearing symptoms have not changed.\par \par We discussed the etiology of his vertigo.  No clear relationship to recent ear surgery is noted.  No change in hearing or auditory symptoms have occurred in or around the recent vertigo attack.  Etiologies may include but are not limited to acute vestibular neuritis, Ménière's disease, perilymph fistula; if related to food poisoning, no further vertigo should be expected.\par \par I have suggested continuing his use of betahistine which was [place].  I have recommended careful monitoring.  I have suggested physical therapy although he is quite active at this time and should improve with activity.\par \par Follow-up recommended in 6 weeks.

## 2023-06-14 NOTE — PHYSICAL EXAM
[FreeTextEntry1] : Procedure: Microscopic Ear Exam\par \par Left ear:  Ear canal intact without inflammation or lesion.  \par Tympanic membrane intact without inflammation.\par \par Right ear:  Ear canal intact without inflammation or lesion.  \par Tympanic membrane intact without inflammation.\par  [Normal] : no rashes

## 2023-06-14 NOTE — HISTORY OF PRESENT ILLNESS
[de-identified] : DEXTER CLARK has a history of progressive hearing loss in the right ear for several years since or about 2007with increasing difficulty hearing voices in noise. Mild intermittent tinnitus. No vertigo. \par FH: No known premature hearing loss [FreeTextEntry1] : 06/14/2023 \par Onset of vertigo 2 weeks ago while away traveling. Progressive vertigo. Nausea. Lasted several days. Some bending when it occurred.  Later had vomitting.  Still notices tinnitus and hearing distortion; but no changes of auditory symptoms noted in or about the vertigo attack. Prolonged imbalance for several days. Now '75%' better. Currently on BetaSerc.  Diagnosed with food poisoning.

## 2023-06-29 ENCOUNTER — APPOINTMENT (OUTPATIENT)
Dept: UROLOGY | Facility: CLINIC | Age: 63
End: 2023-06-29
Payer: COMMERCIAL

## 2023-06-29 VITALS
HEART RATE: 100 BPM | OXYGEN SATURATION: 96 % | TEMPERATURE: 98.2 F | DIASTOLIC BLOOD PRESSURE: 81 MMHG | SYSTOLIC BLOOD PRESSURE: 125 MMHG

## 2023-06-29 DIAGNOSIS — R68.82 DECREASED LIBIDO: ICD-10-CM

## 2023-06-29 PROCEDURE — 99214 OFFICE O/P EST MOD 30 MIN: CPT
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
## 2023-07-20 ENCOUNTER — NON-APPOINTMENT (OUTPATIENT)
Age: 63
End: 2023-07-20

## 2023-07-24 ENCOUNTER — APPOINTMENT (OUTPATIENT)
Dept: OTOLARYNGOLOGY | Facility: CLINIC | Age: 63
End: 2023-07-24
Payer: COMMERCIAL

## 2023-07-24 PROCEDURE — G0268 REMOVAL OF IMPACTED WAX MD: CPT

## 2023-07-24 PROCEDURE — 92567 TYMPANOMETRY: CPT

## 2023-07-24 PROCEDURE — 92557 COMPREHENSIVE HEARING TEST: CPT

## 2023-07-24 PROCEDURE — 99213 OFFICE O/P EST LOW 20 MIN: CPT | Mod: 25

## 2023-07-24 NOTE — ASSESSMENT
[FreeTextEntry1] : No recurrent vertigo.  Hearing distortion perceived with tinnitus.  Hearing testing reveals improvements.\par \par We discussed physical therapy for persistent balance disturbance.  He remains active.  He wishes to consider this.\par \par I have recommended follow-up in 3 months.

## 2023-07-24 NOTE — HISTORY OF PRESENT ILLNESS
[de-identified] : DEXTER CLARK has a history of progressive hearing loss in the right ear for several years since or about 2007with increasing difficulty hearing voices in noise. Mild intermittent tinnitus. No vertigo. \par FH: No known premature hearing loss [FreeTextEntry1] : 07/24/2023 \par Finished BetaSerc medications. No recurrent vertigo reported. Still notices imbalance and easly loss of balance with movement. The patient remains active.  Tinnitus R>L reported uncertain if there has been any improvement.

## 2023-07-24 NOTE — CONSULT LETTER
[Please see my note below.] : Please see my note below. [FreeTextEntry2] : Dear MAGDA SOUSA  [FreeTextEntry1] : Thank you for allowing me to participate in the care of DEXTER CLARK .\par Please see the attached visit note.\par \par \par \par Inocencio Gant\par Otology\par Medical Director of [facility]\par Department of Otolaryngology\par [facility]

## 2023-07-24 NOTE — DATA REVIEWED
[de-identified] : In light of the patients current symptoms, Complete audiometry was ordered and completed today. I have interpreted these results and reviewed them in detail with the patient.\par \par Improved hearing thresholds in the right ear.  Speech recognition intact.  Tympanometry normal

## 2023-07-27 ENCOUNTER — APPOINTMENT (OUTPATIENT)
Dept: CT IMAGING | Facility: CLINIC | Age: 63
End: 2023-07-27
Payer: COMMERCIAL

## 2023-07-27 PROCEDURE — 70480 CT ORBIT/EAR/FOSSA W/O DYE: CPT

## 2023-10-17 ENCOUNTER — APPOINTMENT (OUTPATIENT)
Dept: OTOLARYNGOLOGY | Facility: CLINIC | Age: 63
End: 2023-10-17
Payer: COMMERCIAL

## 2023-10-17 VITALS — HEIGHT: 73 IN | WEIGHT: 170 LBS | BODY MASS INDEX: 22.53 KG/M2

## 2023-10-17 DIAGNOSIS — H90.3 SENSORINEURAL HEARING LOSS, BILATERAL: ICD-10-CM

## 2023-10-17 PROCEDURE — 92567 TYMPANOMETRY: CPT

## 2023-10-17 PROCEDURE — 99214 OFFICE O/P EST MOD 30 MIN: CPT

## 2023-10-17 PROCEDURE — 92557 COMPREHENSIVE HEARING TEST: CPT

## 2023-10-17 PROCEDURE — 92504 EAR MICROSCOPY EXAMINATION: CPT

## 2023-10-23 PROBLEM — H90.3 SENSORINEURAL HEARING LOSS (SNHL) OF BOTH EARS: Status: ACTIVE | Noted: 2022-11-17

## 2023-10-23 RX ORDER — TAMSULOSIN HYDROCHLORIDE 0.4 MG/1
0.4 CAPSULE ORAL
Qty: 30 | Refills: 1 | Status: ACTIVE | COMMUNITY
Start: 2021-04-27 | End: 1900-01-01

## 2023-12-04 ENCOUNTER — APPOINTMENT (OUTPATIENT)
Dept: UROLOGY | Facility: CLINIC | Age: 63
End: 2023-12-04

## 2023-12-11 ENCOUNTER — APPOINTMENT (OUTPATIENT)
Dept: UROLOGY | Facility: CLINIC | Age: 63
End: 2023-12-11
Payer: COMMERCIAL

## 2023-12-11 VITALS — HEART RATE: 116 BPM | OXYGEN SATURATION: 96 % | SYSTOLIC BLOOD PRESSURE: 138 MMHG | DIASTOLIC BLOOD PRESSURE: 86 MMHG

## 2023-12-11 PROCEDURE — 99214 OFFICE O/P EST MOD 30 MIN: CPT

## 2024-01-26 ENCOUNTER — APPOINTMENT (OUTPATIENT)
Dept: UROLOGY | Facility: CLINIC | Age: 64
End: 2024-01-26
Payer: COMMERCIAL

## 2024-01-26 VITALS
TEMPERATURE: 97.5 F | SYSTOLIC BLOOD PRESSURE: 115 MMHG | OXYGEN SATURATION: 95 % | DIASTOLIC BLOOD PRESSURE: 80 MMHG | HEART RATE: 90 BPM

## 2024-01-26 PROCEDURE — 51728 CYSTOMETROGRAM W/VP: CPT

## 2024-01-26 PROCEDURE — 99215 OFFICE O/P EST HI 40 MIN: CPT | Mod: 25

## 2024-01-26 PROCEDURE — 51797 INTRAABDOMINAL PRESSURE TEST: CPT

## 2024-01-26 PROCEDURE — 51784 ANAL/URINARY MUSCLE STUDY: CPT

## 2024-01-26 PROCEDURE — 51741 ELECTRO-UROFLOWMETRY FIRST: CPT

## 2024-01-26 PROCEDURE — 81003 URINALYSIS AUTO W/O SCOPE: CPT | Mod: QW

## 2024-01-29 LAB
BILIRUB UR QL STRIP: NORMAL
CLARITY UR: CLEAR
COLLECTION METHOD: NORMAL
GLUCOSE UR-MCNC: NORMAL
HCG UR QL: 1 EU/DL
HGB UR QL STRIP.AUTO: NORMAL
KETONES UR-MCNC: NORMAL
LEUKOCYTE ESTERASE UR QL STRIP: NORMAL
NITRITE UR QL STRIP: NORMAL
PH UR STRIP: 6
PROT UR STRIP-MCNC: NORMAL
SP GR UR STRIP: 1.02

## 2024-01-29 NOTE — ASSESSMENT
[FreeTextEntry1] :   Impression/plan: 63-year-old gentleman with BPH/LUTS and OAB-wet.  Urodynamics reviewed, reveals detrusor overactivity, normal urine flow, slightly elevated voiding pressure, no clear sign of outlet obstruction.  1. Options for management of the patient's overactive bladder and incontinence discussed at length. These included medical therapy, behavioral modification, bladder retraining, and surgical options. Surgical options for third line therapies reviewed included injection of botulinum toxin versus sacral nerve stimulation therapy. The patient would like to start with behavioral modification, bladder retraining and medical therapy. Will try Gemtesa 75 mg daily, se discussed.

## 2024-01-29 NOTE — HISTORY OF PRESENT ILLNESS
[FreeTextEntry1] : 60-year-old gentleman with a longstanding history of LUTS, with main bother being nocturia x 4-5, daytime urinary frequency every to 2 hours, and urinary urgency.  He has been on daily Cialis for combination of his BPH/LUTS and ED in addition to tamsulosin.  He has most recently started to develop urge incontinence. The patient is here today to have urodynamics performed, review results and discuss treatment options. Denies significant change in med/surg history since last office visit.   UDS -  Filling/Storage Phase: First sensation 11 mL, First desire 50 mL, Normal desire 158 mL and Cystometric capacity 444 mL. Involuntary contractions were present. Compliance: normal. EMG Activity: normal.    Voiding Phase: Qmax 24.5 mL/s , Pdet at Qmax 57.9 cm/H20, Qmax at Pdet mL/s, Pavg 42.4 cm/H20, Qavg 7.3 mL/s, voiding time 2:11 mm:sec, voided volume 364 mL and post void residual 0 mL. EMG activity was synergic.   Additional Comments: Some urine missed funnel.   Urodynamic Interpretation :   Normal bladder sensation. Normal bladder capacity. Patient experiencing detrusor instability. The uroflow rate is normal. The uroflow pattern is staccato. The detrusor contractility is normal. The intravesical voiding pressure is normal. The patient has complete bladder emptying. The spincter activity  is normal synergic.  PMH: As above, melanoma, BCC, Fatty Liver (he was told this was not due to his EtOH), SIBO, herniated disks, diverticulitis PSH: Rectal fissures, Two melanomas POBH: (if applicable) FH: Skin cancer  ALL: NKA MEDS: Melatonin, B12 (to start soon), nortryptiline, tamsulosin SOC: 1 bottle of wine per day; Cigar monthly; Denies illegal drugs

## 2024-02-05 ENCOUNTER — APPOINTMENT (OUTPATIENT)
Dept: UROLOGY | Facility: CLINIC | Age: 64
End: 2024-02-05
Payer: COMMERCIAL

## 2024-02-05 VITALS
OXYGEN SATURATION: 97 % | SYSTOLIC BLOOD PRESSURE: 118 MMHG | HEART RATE: 105 BPM | WEIGHT: 168 LBS | BODY MASS INDEX: 22.26 KG/M2 | TEMPERATURE: 94.64 F | HEIGHT: 73 IN | DIASTOLIC BLOOD PRESSURE: 81 MMHG

## 2024-02-05 PROCEDURE — 99214 OFFICE O/P EST MOD 30 MIN: CPT

## 2024-02-05 RX ORDER — VIBEGRON 75 MG/1
75 TABLET, FILM COATED ORAL
Qty: 30 | Refills: 11 | Status: ACTIVE | COMMUNITY
Start: 2024-02-05 | End: 1900-01-01
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
## 2024-02-08 ENCOUNTER — APPOINTMENT (OUTPATIENT)
Dept: UROLOGY | Facility: CLINIC | Age: 64
End: 2024-02-08
Payer: COMMERCIAL

## 2024-02-08 PROCEDURE — 99212 OFFICE O/P EST SF 10 MIN: CPT

## 2024-02-08 RX ORDER — VIBEGRON 75 MG/1
75 TABLET, FILM COATED ORAL
Qty: 30 | Refills: 11 | Status: ACTIVE | COMMUNITY
Start: 2024-02-08 | End: 1900-01-01

## 2024-02-21 ENCOUNTER — APPOINTMENT (OUTPATIENT)
Dept: UROLOGY | Facility: CLINIC | Age: 64
End: 2024-02-21
Payer: COMMERCIAL

## 2024-02-21 PROCEDURE — 99442: CPT

## 2024-02-22 NOTE — HISTORY OF PRESENT ILLNESS
[Other Location: e.g. School (Enter Location, City,State)___] : at [unfilled], at the time of the visit. [Medical Office: ([facility])___] : at the medical office located in  [Verbal consent obtained from patient] : the patient, [unfilled] [FreeTextEntry1] : 62 yo gentleman with BPH/LUTS and OAB on dual therapy with tamsulosin and Gemtesa, much happier with voiding pattern.    States nocturia down to x 1  Daytime symptoms leveled off, happier with overall response.   He wants to cont dual therapy  Will message Dr. Philip regarding prior Auth that had been started.

## 2024-02-28 ENCOUNTER — NON-APPOINTMENT (OUTPATIENT)
Age: 64
End: 2024-02-28

## 2024-03-01 ENCOUNTER — NON-APPOINTMENT (OUTPATIENT)
Age: 64
End: 2024-03-01

## 2024-03-25 ENCOUNTER — APPOINTMENT (OUTPATIENT)
Dept: OTOLARYNGOLOGY | Facility: CLINIC | Age: 64
End: 2024-03-25
Payer: COMMERCIAL

## 2024-03-25 DIAGNOSIS — H90.A31 MIXED CONDUCTIVE AND SENSORINEURAL HEARING LOSS, UNILATERAL, RIGHT EAR WITH RESTRICTED HEARING ON THE  CONTRALATERAL SIDE: ICD-10-CM

## 2024-03-25 DIAGNOSIS — H80.90 UNSPECIFIED OTOSCLEROSIS, UNSPECIFIED EAR: ICD-10-CM

## 2024-03-25 DIAGNOSIS — H93.299 OTHER ABNORMAL AUDITORY PERCEPTIONS, UNSPECIFIED EAR: ICD-10-CM

## 2024-03-25 PROCEDURE — 92550 TYMPANOMETRY & REFLEX THRESH: CPT | Mod: 52

## 2024-03-25 PROCEDURE — 99213 OFFICE O/P EST LOW 20 MIN: CPT

## 2024-03-25 PROCEDURE — 92504 EAR MICROSCOPY EXAMINATION: CPT

## 2024-03-25 PROCEDURE — 92557 COMPREHENSIVE HEARING TEST: CPT

## 2024-03-25 NOTE — REASON FOR VISIT
[Subsequent Evaluation] : a subsequent evaluation for [Hearing Loss] : hearing loss [FreeTextEntry2] : 6 month follow up right ear hearing loss

## 2024-03-25 NOTE — DATA REVIEWED
[de-identified] : In light of the patients current symptoms, Complete audiometry was ordered and completed today. I have interpreted these results and reviewed them in detail with the patient.  Moderate mixed hearing loss right ear.  Sensorineural hearing loss left ear.  Speech recognition intact bilaterally

## 2024-03-25 NOTE — ASSESSMENT
[FreeTextEntry1] : Stable hearing with hearing loss affecting the right ear greater than the left.  Management options reviewed.  I have recommended considering bilateral amplification.  Vestibular symptoms are positionally induced.  I have recommended continued use of physical therapy for balance training and vestibular rehabilitation as needed.  Follow-up recommended annually and as needed.

## 2024-03-25 NOTE — HISTORY OF PRESENT ILLNESS
[de-identified] : DEXTER CLARK has a history of progressive hearing loss in the right ear for several years since or about 2007with increasing difficulty hearing voices in noise. Mild intermittent tinnitus. No vertigo. FH: No known premature hearing loss March 2023: right stapedotomy [FreeTextEntry1] :  03/25/2024 No change in symptoms.  Occasional dizziness with challenging movements. Tinnitus noted but uncertain location R >L.

## 2024-03-26 PROBLEM — H93.299 ABNORMAL AUDITORY PERCEPTION: Status: ACTIVE | Noted: 2023-02-28

## 2024-04-04 ENCOUNTER — APPOINTMENT (OUTPATIENT)
Dept: UROLOGY | Facility: CLINIC | Age: 64
End: 2024-04-04
Payer: COMMERCIAL

## 2024-04-04 VITALS
SYSTOLIC BLOOD PRESSURE: 150 MMHG | HEART RATE: 99 BPM | TEMPERATURE: 97.52 F | DIASTOLIC BLOOD PRESSURE: 95 MMHG | OXYGEN SATURATION: 96 %

## 2024-04-04 PROCEDURE — 99213 OFFICE O/P EST LOW 20 MIN: CPT

## 2024-04-04 RX ORDER — OXYBUTYNIN CHLORIDE 5 MG/1
5 TABLET ORAL
Qty: 30 | Refills: 0 | Status: ACTIVE | COMMUNITY
Start: 2024-04-04 | End: 1900-01-01

## 2024-04-04 RX ORDER — OXYBUTYNIN CHLORIDE 5 MG/1
5 TABLET ORAL
Qty: 30 | Refills: 1 | Status: ACTIVE | COMMUNITY
Start: 2024-04-04 | End: 1900-01-01
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
## 2024-04-25 ENCOUNTER — APPOINTMENT (OUTPATIENT)
Dept: OTOLARYNGOLOGY | Facility: CLINIC | Age: 64
End: 2024-04-25

## 2024-05-21 ENCOUNTER — APPOINTMENT (OUTPATIENT)
Dept: UROLOGY | Facility: CLINIC | Age: 64
End: 2024-05-21
Payer: COMMERCIAL

## 2024-05-21 PROCEDURE — 99213 OFFICE O/P EST LOW 20 MIN: CPT

## 2024-05-21 RX ORDER — TROSPIUM CHLORIDE 60 MG/1
60 CAPSULE, EXTENDED RELEASE ORAL
Qty: 30 | Refills: 6 | Status: ACTIVE | COMMUNITY
Start: 2024-03-01 | End: 1900-01-01

## 2024-05-21 RX ORDER — TAMSULOSIN HYDROCHLORIDE 0.4 MG/1
0.4 CAPSULE ORAL
Qty: 30 | Refills: 11 | Status: ACTIVE | COMMUNITY
Start: 2023-04-25 | End: 1900-01-01

## 2024-05-21 RX ORDER — SILDENAFIL 50 MG/1
50 TABLET ORAL
Qty: 6 | Refills: 3 | Status: ACTIVE | COMMUNITY
Start: 2024-05-21 | End: 1900-01-01
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
## 2024-05-24 ENCOUNTER — NON-APPOINTMENT (OUTPATIENT)
Age: 64
End: 2024-05-24

## 2024-05-31 ENCOUNTER — APPOINTMENT (OUTPATIENT)
Dept: UROLOGY | Facility: CLINIC | Age: 64
End: 2024-05-31
Payer: COMMERCIAL

## 2024-05-31 VITALS
TEMPERATURE: 97.88 F | OXYGEN SATURATION: 97 % | HEART RATE: 97 BPM | DIASTOLIC BLOOD PRESSURE: 89 MMHG | SYSTOLIC BLOOD PRESSURE: 127 MMHG

## 2024-05-31 PROCEDURE — 64566 NEUROELTRD STIM POST TIBIAL: CPT

## 2024-05-31 RX ORDER — TROSPIUM CHLORIDE 20 MG/1
20 TABLET, FILM COATED ORAL TWICE DAILY
Qty: 180 | Refills: 1 | Status: ACTIVE | COMMUNITY
Start: 2024-03-04 | End: 1900-01-01

## 2024-06-07 ENCOUNTER — APPOINTMENT (OUTPATIENT)
Dept: UROLOGY | Facility: CLINIC | Age: 64
End: 2024-06-07
Payer: COMMERCIAL

## 2024-06-07 VITALS
TEMPERATURE: 96.62 F | OXYGEN SATURATION: 97 % | HEART RATE: 99 BPM | SYSTOLIC BLOOD PRESSURE: 128 MMHG | DIASTOLIC BLOOD PRESSURE: 83 MMHG

## 2024-06-07 PROCEDURE — 64566 NEUROELTRD STIM POST TIBIAL: CPT

## 2024-06-14 ENCOUNTER — APPOINTMENT (OUTPATIENT)
Dept: UROLOGY | Facility: CLINIC | Age: 64
End: 2024-06-14
Payer: COMMERCIAL

## 2024-06-14 PROCEDURE — 64566 NEUROELTRD STIM POST TIBIAL: CPT

## 2024-06-21 ENCOUNTER — APPOINTMENT (OUTPATIENT)
Dept: UROLOGY | Facility: CLINIC | Age: 64
End: 2024-06-21
Payer: COMMERCIAL

## 2024-06-21 DIAGNOSIS — R39.9 UNSPECIFIED SYMPTOMS AND SIGNS INVOLVING THE GENITOURINARY SYSTEM: ICD-10-CM

## 2024-06-21 PROCEDURE — 64566 NEUROELTRD STIM POST TIBIAL: CPT

## 2024-06-24 PROBLEM — R39.9 LOWER URINARY TRACT SYMPTOMS (LUTS): Status: ACTIVE | Noted: 2021-04-26

## 2024-06-28 ENCOUNTER — APPOINTMENT (OUTPATIENT)
Dept: UROLOGY | Facility: CLINIC | Age: 64
End: 2024-06-28
Payer: COMMERCIAL

## 2024-06-28 DIAGNOSIS — R35.1 NOCTURIA: ICD-10-CM

## 2024-06-28 DIAGNOSIS — R35.0 FREQUENCY OF MICTURITION: ICD-10-CM

## 2024-06-28 PROCEDURE — 64566 NEUROELTRD STIM POST TIBIAL: CPT

## 2024-06-30 PROBLEM — R35.0 URINARY FREQUENCY: Status: ACTIVE | Noted: 2023-04-25

## 2024-06-30 PROBLEM — R35.1 NOCTURIA: Status: ACTIVE | Noted: 2023-04-25

## 2024-07-03 ENCOUNTER — APPOINTMENT (OUTPATIENT)
Dept: UROLOGY | Facility: CLINIC | Age: 64
End: 2024-07-03
Payer: COMMERCIAL

## 2024-07-03 ENCOUNTER — TRANSCRIPTION ENCOUNTER (OUTPATIENT)
Age: 64
End: 2024-07-03

## 2024-07-03 VITALS
DIASTOLIC BLOOD PRESSURE: 87 MMHG | TEMPERATURE: 98.1 F | RESPIRATION RATE: 20 BRPM | SYSTOLIC BLOOD PRESSURE: 124 MMHG | HEART RATE: 99 BPM | OXYGEN SATURATION: 97 %

## 2024-07-03 PROCEDURE — 64566 NEUROELTRD STIM POST TIBIAL: CPT

## 2024-07-12 ENCOUNTER — APPOINTMENT (OUTPATIENT)
Dept: UROLOGY | Facility: CLINIC | Age: 64
End: 2024-07-12
Payer: COMMERCIAL

## 2024-07-12 DIAGNOSIS — R35.1 NOCTURIA: ICD-10-CM

## 2024-07-12 DIAGNOSIS — R35.0 FREQUENCY OF MICTURITION: ICD-10-CM

## 2024-07-12 PROCEDURE — 64566 NEUROELTRD STIM POST TIBIAL: CPT

## 2024-07-19 ENCOUNTER — APPOINTMENT (OUTPATIENT)
Dept: UROLOGY | Facility: CLINIC | Age: 64
End: 2024-07-19
Payer: COMMERCIAL

## 2024-07-19 DIAGNOSIS — R35.0 FREQUENCY OF MICTURITION: ICD-10-CM

## 2024-07-19 PROCEDURE — 64566 NEUROELTRD STIM POST TIBIAL: CPT

## 2024-07-26 ENCOUNTER — APPOINTMENT (OUTPATIENT)
Dept: UROLOGY | Facility: CLINIC | Age: 64
End: 2024-07-26
Payer: COMMERCIAL

## 2024-07-26 DIAGNOSIS — R35.0 FREQUENCY OF MICTURITION: ICD-10-CM

## 2024-07-26 DIAGNOSIS — R39.9 UNSPECIFIED SYMPTOMS AND SIGNS INVOLVING THE GENITOURINARY SYSTEM: ICD-10-CM

## 2024-07-26 DIAGNOSIS — R35.1 NOCTURIA: ICD-10-CM

## 2024-07-26 PROCEDURE — 99214 OFFICE O/P EST MOD 30 MIN: CPT

## 2024-07-30 NOTE — LETTER BODY
[Dear  ___] : Dear  [unfilled], [Courtesy Letter:] : I had the pleasure of seeing your patient, [unfilled], in my office today. [Please see my note below.] : Please see my note below. [Consult Closing:] : Thank you very much for allowing me to participate in the care of this patient.  If you have any questions, please do not hesitate to contact me. [Sincerely,] : Sincerely, [FreeTextEntry3] : Mallory Johnson MD System Director Urogynecology/FPS Department of Urology [facility]    at The [facility]  of Urology [facility] [employer] [facility] at [facility]

## 2024-07-30 NOTE — LETTER BODY
[Dear  ___] : Dear  [unfilled], [Courtesy Letter:] : I had the pleasure of seeing your patient, [unfilled], in my office today. [Please see my note below.] : Please see my note below. [Consult Closing:] : Thank you very much for allowing me to participate in the care of this patient.  If you have any questions, please do not hesitate to contact me. [Sincerely,] : Sincerely, [FreeTextEntry3] : Mallory Johnson MD System Director Urogynecology/FPS Department of Urology [facility]    at The [facility]  of Urology [facility] [employer] [facility] at [facility]

## 2024-07-30 NOTE — HISTORY OF PRESENT ILLNESS
[FreeTextEntry1] : 64 yo gentleman with BPH/LUTS and OAB on dual therapy with tamsulosin and Gemtesa, much happier with voiding pattern.  States nocturia down to x 1  Daytime symptoms leveled off, happier with overall response.  He wants to cont dual therapy  Will message Dr. Philip regarding prior Auth that had been started.   7/26.24  64 yo gentleman with BPH/LUTS and OAB s/p 8 treatments of PTNS.   He did not have an improvement in his LUTS/OAB symptoms.   Plan: We spent time reviewing SNS option of neuromodulation and Botox. We reviewed mechanisms and rationale for these approaches. Literature reviewed and provided for the patient. 
[FreeTextEntry1] : 64 yo gentleman with BPH/LUTS and OAB on dual therapy with tamsulosin and Gemtesa, much happier with voiding pattern.  States nocturia down to x 1  Daytime symptoms leveled off, happier with overall response.  He wants to cont dual therapy  Will message Dr. Philip regarding prior Auth that had been started.   7/26.24  64 yo gentleman with BPH/LUTS and OAB s/p 8 treatments of PTNS.   He did not have an improvement in his LUTS/OAB symptoms.   Plan: We spent time reviewing SNS option of neuromodulation and Botox. We reviewed mechanisms and rationale for these approaches. Literature reviewed and provided for the patient. 
surg

## 2024-08-02 ENCOUNTER — APPOINTMENT (OUTPATIENT)
Dept: UROLOGY | Facility: CLINIC | Age: 64
End: 2024-08-02

## 2024-08-09 ENCOUNTER — APPOINTMENT (OUTPATIENT)
Dept: UROLOGY | Facility: CLINIC | Age: 64
End: 2024-08-09

## 2024-08-16 ENCOUNTER — APPOINTMENT (OUTPATIENT)
Dept: UROLOGY | Facility: CLINIC | Age: 64
End: 2024-08-16

## 2024-09-24 ENCOUNTER — RX RENEWAL (OUTPATIENT)
Age: 64
End: 2024-09-24

## 2024-09-24 ENCOUNTER — APPOINTMENT (OUTPATIENT)
Dept: UROLOGY | Facility: CLINIC | Age: 64
End: 2024-09-24
Payer: COMMERCIAL

## 2024-09-24 VITALS
TEMPERATURE: 98 F | DIASTOLIC BLOOD PRESSURE: 87 MMHG | OXYGEN SATURATION: 97 % | SYSTOLIC BLOOD PRESSURE: 121 MMHG | HEART RATE: 92 BPM

## 2024-09-24 PROCEDURE — 99214 OFFICE O/P EST MOD 30 MIN: CPT

## 2024-10-02 ENCOUNTER — NON-APPOINTMENT (OUTPATIENT)
Age: 64
End: 2024-10-02
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
## 2024-11-18 ENCOUNTER — APPOINTMENT (OUTPATIENT)
Dept: UROLOGY | Facility: CLINIC | Age: 64
End: 2024-11-18
Payer: COMMERCIAL

## 2024-11-18 PROCEDURE — 99214 OFFICE O/P EST MOD 30 MIN: CPT

## 2025-05-19 ENCOUNTER — APPOINTMENT (OUTPATIENT)
Dept: UROLOGY | Facility: CLINIC | Age: 65
End: 2025-05-19
Payer: COMMERCIAL

## 2025-05-19 ENCOUNTER — NON-APPOINTMENT (OUTPATIENT)
Age: 65
End: 2025-05-19

## 2025-05-19 VITALS
OXYGEN SATURATION: 96 % | HEART RATE: 87 BPM | SYSTOLIC BLOOD PRESSURE: 141 MMHG | DIASTOLIC BLOOD PRESSURE: 95 MMHG | TEMPERATURE: 97.3 F

## 2025-05-19 PROCEDURE — 99214 OFFICE O/P EST MOD 30 MIN: CPT

## 2025-06-09 ENCOUNTER — NON-APPOINTMENT (OUTPATIENT)
Age: 65
End: 2025-06-09